# Patient Record
Sex: FEMALE | Race: ASIAN | NOT HISPANIC OR LATINO | URBAN - METROPOLITAN AREA
[De-identification: names, ages, dates, MRNs, and addresses within clinical notes are randomized per-mention and may not be internally consistent; named-entity substitution may affect disease eponyms.]

---

## 2017-01-26 ENCOUNTER — COMMUNICATION - HEALTHEAST (OUTPATIENT)
Dept: FAMILY MEDICINE | Facility: CLINIC | Age: 27
End: 2017-01-26

## 2017-01-26 ENCOUNTER — OFFICE VISIT - HEALTHEAST (OUTPATIENT)
Dept: FAMILY MEDICINE | Facility: CLINIC | Age: 27
End: 2017-01-26

## 2017-01-26 DIAGNOSIS — B37.31 YEAST VAGINITIS: ICD-10-CM

## 2017-01-26 DIAGNOSIS — N94.9 VAGINAL SYMPTOM: ICD-10-CM

## 2017-01-26 DIAGNOSIS — Z12.4 CERVICAL CANCER SCREENING: ICD-10-CM

## 2017-01-26 RX ORDER — POLYETHYLENE GLYCOL 3350 17 G/17G
POWDER, FOR SOLUTION ORAL
Refills: 4 | Status: SHIPPED | COMMUNITY
Start: 2016-10-19

## 2017-01-26 ASSESSMENT — MIFFLIN-ST. JEOR: SCORE: 1089.43

## 2017-01-27 ENCOUNTER — COMMUNICATION - HEALTHEAST (OUTPATIENT)
Dept: FAMILY MEDICINE | Facility: CLINIC | Age: 27
End: 2017-01-27

## 2017-02-01 LAB
HPV INTERPRETATION - HISTORICAL: NORMAL
HPV INTERPRETER - HISTORICAL: NORMAL

## 2017-02-02 LAB
BKR LAB AP ABNORMAL BLEEDING: NO
BKR LAB AP BIRTH CONTROL/HORMONES: NORMAL
BKR LAB AP CERVICAL APPEARANCE: NORMAL
BKR LAB AP GYN ADEQUACY: NORMAL
BKR LAB AP GYN INTERPRETATION: NORMAL
BKR LAB AP HPV REFLEX: NORMAL
BKR LAB AP LMP: 12
BKR LAB AP PATIENT STATUS: NORMAL
BKR LAB AP PREVIOUS ABNORMAL: NORMAL
BKR LAB AP PREVIOUS NORMAL: NORMAL
HIGH RISK?: NO
PATH REPORT.COMMENTS IMP SPEC: NORMAL
RESULT FLAG (HE HISTORICAL CONVERSION): NORMAL

## 2017-02-03 ENCOUNTER — COMMUNICATION - HEALTHEAST (OUTPATIENT)
Dept: FAMILY MEDICINE | Facility: CLINIC | Age: 27
End: 2017-02-03

## 2017-06-05 ENCOUNTER — PRENATAL OFFICE VISIT - HEALTHEAST (OUTPATIENT)
Dept: FAMILY MEDICINE | Facility: CLINIC | Age: 27
End: 2017-06-05

## 2017-06-05 DIAGNOSIS — Z32.01 POSITIVE PREGNANCY TEST: ICD-10-CM

## 2017-06-05 DIAGNOSIS — Z34.90 SUPERVISION OF NORMAL PREGNANCY: ICD-10-CM

## 2017-06-05 DIAGNOSIS — Z34.90 NORMAL PREGNANCY: ICD-10-CM

## 2017-06-05 LAB — HIV 1+2 AB+HIV1 P24 AG SERPL QL IA: NEGATIVE

## 2017-06-05 ASSESSMENT — MIFFLIN-ST. JEOR: SCORE: 1080.36

## 2017-06-06 LAB
HBV SURFACE AG SERPL QL IA: NEGATIVE
SYPHILIS RPR SCREEN - HISTORICAL: NORMAL

## 2017-06-12 ENCOUNTER — RECORDS - HEALTHEAST (OUTPATIENT)
Dept: ADMINISTRATIVE | Facility: OTHER | Age: 27
End: 2017-06-12

## 2017-08-30 ENCOUNTER — PRENATAL OFFICE VISIT - HEALTHEAST (OUTPATIENT)
Dept: FAMILY MEDICINE | Facility: CLINIC | Age: 27
End: 2017-08-30

## 2017-08-30 ENCOUNTER — RECORDS - HEALTHEAST (OUTPATIENT)
Dept: ADMINISTRATIVE | Facility: OTHER | Age: 27
End: 2017-08-30

## 2017-08-30 DIAGNOSIS — Z34.90 SUPERVISION OF NORMAL PREGNANCY: ICD-10-CM

## 2017-09-27 ENCOUNTER — PRENATAL OFFICE VISIT - HEALTHEAST (OUTPATIENT)
Dept: FAMILY MEDICINE | Facility: CLINIC | Age: 27
End: 2017-09-27

## 2017-09-27 DIAGNOSIS — Z34.90 SUPERVISION OF NORMAL PREGNANCY: ICD-10-CM

## 2017-09-27 DIAGNOSIS — Z34.93 SUPERVISION OF NORMAL PREGNANCY IN THIRD TRIMESTER: ICD-10-CM

## 2017-10-05 ENCOUNTER — AMBULATORY - HEALTHEAST (OUTPATIENT)
Dept: LAB | Facility: CLINIC | Age: 27
End: 2017-10-05

## 2017-10-05 ENCOUNTER — AMBULATORY - HEALTHEAST (OUTPATIENT)
Dept: FAMILY MEDICINE | Facility: CLINIC | Age: 27
End: 2017-10-05

## 2017-10-05 DIAGNOSIS — Z34.90 NORMAL PREGNANCY: ICD-10-CM

## 2017-10-06 LAB — SYPHILIS RPR SCREEN - HISTORICAL: NORMAL

## 2017-10-16 ENCOUNTER — AMBULATORY - HEALTHEAST (OUTPATIENT)
Dept: FAMILY MEDICINE | Facility: CLINIC | Age: 27
End: 2017-10-16

## 2017-10-16 DIAGNOSIS — D64.9 ANEMIA: ICD-10-CM

## 2017-10-17 ENCOUNTER — COMMUNICATION - HEALTHEAST (OUTPATIENT)
Dept: FAMILY MEDICINE | Facility: CLINIC | Age: 27
End: 2017-10-17

## 2017-10-26 ENCOUNTER — PRENATAL OFFICE VISIT - HEALTHEAST (OUTPATIENT)
Dept: FAMILY MEDICINE | Facility: CLINIC | Age: 27
End: 2017-10-26

## 2017-10-26 DIAGNOSIS — Z34.90 SUPERVISION OF NORMAL PREGNANCY: ICD-10-CM

## 2017-11-09 ENCOUNTER — PRENATAL OFFICE VISIT - HEALTHEAST (OUTPATIENT)
Dept: FAMILY MEDICINE | Facility: CLINIC | Age: 27
End: 2017-11-09

## 2017-11-09 DIAGNOSIS — Z34.90 SUPERVISION OF NORMAL PREGNANCY: ICD-10-CM

## 2017-11-17 ENCOUNTER — PRENATAL OFFICE VISIT - HEALTHEAST (OUTPATIENT)
Dept: FAMILY MEDICINE | Facility: CLINIC | Age: 27
End: 2017-11-17

## 2017-11-17 DIAGNOSIS — Z34.93 THIRD TRIMESTER PREGNANCY: ICD-10-CM

## 2017-11-17 DIAGNOSIS — Z34.90 SUPERVISION OF NORMAL PREGNANCY: ICD-10-CM

## 2017-11-24 ENCOUNTER — PRENATAL OFFICE VISIT - HEALTHEAST (OUTPATIENT)
Dept: FAMILY MEDICINE | Facility: CLINIC | Age: 27
End: 2017-11-24

## 2017-11-24 DIAGNOSIS — Z34.90 SUPERVISION OF NORMAL PREGNANCY: ICD-10-CM

## 2017-11-30 ENCOUNTER — PRENATAL OFFICE VISIT - HEALTHEAST (OUTPATIENT)
Dept: FAMILY MEDICINE | Facility: CLINIC | Age: 27
End: 2017-11-30

## 2017-11-30 DIAGNOSIS — Z34.90 SUPERVISION OF NORMAL PREGNANCY: ICD-10-CM

## 2017-12-07 ENCOUNTER — HOME CARE/HOSPICE - HEALTHEAST (OUTPATIENT)
Dept: HOME HEALTH SERVICES | Facility: HOME HEALTH | Age: 27
End: 2017-12-07

## 2017-12-08 ENCOUNTER — AMBULATORY - HEALTHEAST (OUTPATIENT)
Dept: FAMILY MEDICINE | Facility: CLINIC | Age: 27
End: 2017-12-08

## 2017-12-09 ENCOUNTER — HOME CARE/HOSPICE - HEALTHEAST (OUTPATIENT)
Dept: HOME HEALTH SERVICES | Facility: HOME HEALTH | Age: 27
End: 2017-12-09

## 2018-05-07 ENCOUNTER — COMMUNICATION - HEALTHEAST (OUTPATIENT)
Dept: FAMILY MEDICINE | Facility: CLINIC | Age: 28
End: 2018-05-07

## 2018-05-07 DIAGNOSIS — J30.9 ALLERGIC RHINITIS: ICD-10-CM

## 2020-01-13 ENCOUNTER — OFFICE VISIT - HEALTHEAST (OUTPATIENT)
Dept: FAMILY MEDICINE | Facility: CLINIC | Age: 30
End: 2020-01-13

## 2020-01-13 DIAGNOSIS — N94.6 MENSTRUAL CRAMPS: ICD-10-CM

## 2020-01-13 DIAGNOSIS — Z30.9 ENCOUNTER FOR CONTRACEPTIVE MANAGEMENT, UNSPECIFIED TYPE: ICD-10-CM

## 2020-01-13 LAB — HCG UR QL: NEGATIVE

## 2020-01-13 RX ORDER — NAPROXEN 500 MG/1
500 TABLET ORAL 2 TIMES DAILY WITH MEALS
Qty: 60 TABLET | Refills: 0 | Status: SHIPPED | OUTPATIENT
Start: 2020-01-13

## 2020-01-13 ASSESSMENT — MIFFLIN-ST. JEOR: SCORE: 1102.48

## 2020-04-09 ENCOUNTER — AMBULATORY - HEALTHEAST (OUTPATIENT)
Dept: FAMILY MEDICINE | Facility: CLINIC | Age: 30
End: 2020-04-09

## 2020-04-09 DIAGNOSIS — Z30.9 ENCOUNTER FOR CONTRACEPTIVE MANAGEMENT, UNSPECIFIED TYPE: ICD-10-CM

## 2020-04-13 ENCOUNTER — AMBULATORY - HEALTHEAST (OUTPATIENT)
Dept: NURSING | Facility: CLINIC | Age: 30
End: 2020-04-13

## 2021-05-30 VITALS — HEIGHT: 59 IN | WEIGHT: 102 LBS | BODY MASS INDEX: 20.56 KG/M2

## 2021-05-30 VITALS — BODY MASS INDEX: 20.96 KG/M2 | WEIGHT: 104 LBS | HEIGHT: 59 IN

## 2021-05-31 VITALS — BODY MASS INDEX: 27.53 KG/M2 | WEIGHT: 134 LBS

## 2021-05-31 VITALS — BODY MASS INDEX: 27.58 KG/M2 | WEIGHT: 134.25 LBS

## 2021-05-31 VITALS — WEIGHT: 134 LBS | BODY MASS INDEX: 27.53 KG/M2

## 2021-05-31 VITALS — WEIGHT: 129.01 LBS | BODY MASS INDEX: 26.5 KG/M2

## 2021-05-31 VITALS — BODY MASS INDEX: 27.94 KG/M2 | WEIGHT: 136 LBS

## 2021-05-31 VITALS — WEIGHT: 126 LBS | BODY MASS INDEX: 25.89 KG/M2

## 2021-05-31 VITALS — BODY MASS INDEX: 24.24 KG/M2 | WEIGHT: 118 LBS

## 2021-06-04 VITALS
BODY MASS INDEX: 22.46 KG/M2 | SYSTOLIC BLOOD PRESSURE: 112 MMHG | WEIGHT: 107 LBS | HEART RATE: 72 BPM | RESPIRATION RATE: 18 BRPM | DIASTOLIC BLOOD PRESSURE: 70 MMHG | HEIGHT: 58 IN

## 2021-06-05 ENCOUNTER — RECORDS - HEALTHEAST (OUTPATIENT)
Dept: ULTRASOUND IMAGING | Facility: CLINIC | Age: 31
End: 2021-06-05

## 2021-06-05 DIAGNOSIS — Z34.80 ENCOUNTER FOR SUPERVISION OF NORMAL PREGNANCY IN MULTIGRAVIDA: ICD-10-CM

## 2021-06-05 NOTE — PROGRESS NOTES
"  Subjective:    Riki Sprague is a 29 y.o. female who presents for evaluation of contraception management.  She is interested in using Depo-Provera.  She is used Depo-Provera before.  She notes that she only used it for 3 months.  During that time she did have pretty frequent bleeding.  However, she still prefers this to other forms of birth control.  She is used Nexplanon in the past and has also had continuous bleeding.  Patient's last menstrual period was 2020.      Patient Active Problem List   Diagnosis     Nonspecific reaction to tuberculin skin test without active tuberculosis     Abnormal Pap Smear Of Cervix     Constipation      (normal spontaneous vaginal delivery)     Postpartum hemorrhage     Acute blood loss anemia       Current Outpatient Medications:      fluticasone (FLONASE) 50 mcg/actuation nasal spray, 2 sprays into each nostril daily., Disp: 10 g, Rfl: 2     ibuprofen (ADVIL,MOTRIN) 800 MG tablet, Take 1 tablet (800 mg total) by mouth every 8 (eight) hours., Disp: 30 tablet, Rfl: 0     naproxen (NAPROSYN) 500 MG tablet, Take 1 tablet (500 mg total) by mouth 2 (two) times a day with meals., Disp: 60 tablet, Rfl: 0     polyethylene glycol (GLYCOLAX) 17 gram/dose powder, , Disp: , Rfl: 4  No current facility-administered medications for this visit.      Objective:   Allergies:  Patient has no known allergies.    Vitals:  Vitals:    20 1629   BP: 112/70   Patient Site: Right Arm   Patient Position: Sitting   Cuff Size: Adult Regular   Pulse: 72   Resp: 18   Weight: 107 lb (48.5 kg)   Height: 4' 10.47\" (1.485 m)     Body mass index is 22.01 kg/m .    Vital signs reviewed.  General: Patient is alert and oriented x 3, in no apparent distress  Cardiac: regular rate and rhythm, no murmurs  Pulmonary: lungs clear to auscultation bilaterally, no crackles, rales, rhonchi, or wheezing noted    Results for orders placed or performed in visit on 20   Pregnancy (Beta-hCG, Qual), Urine   Result " Value Ref Range    Pregnancy Test, Urine Negative Negative       Assessment and Plan:   1.  Contraception management, Depo-Provera.  Depo-Provera shot was given to patient today.  She will use backup birth control for the next 1 week.  Follow-up in 3 months for Depo-Provera renewal.  No other concerns.    2.  Menstrual cramps.  She reports that right before and during her period she does have bothersome abdominal cramps.  No history of stomach ulcers.  Prescription sent for naproxen and I reviewed appropriate use with her.  She will take with food.  Follow-up as needed.    This dictation uses voice recognition software, which may contain typographical errors.

## 2021-06-07 ENCOUNTER — RECORDS - HEALTHEAST (OUTPATIENT)
Dept: LAB | Facility: CLINIC | Age: 31
End: 2021-06-07

## 2021-06-07 DIAGNOSIS — Z34.80 ENCOUNTER FOR SUPERVISION OF NORMAL PREGNANCY IN MULTIGRAVIDA: ICD-10-CM

## 2021-06-08 NOTE — PROGRESS NOTES
abnl discharge since last delivery 2 years ago.    Pre was 3 days per month.  Now is one day and light flow.  Still monthly.  If pad is used, no period.  If no pad, is period.  Pre no cramps.  Now is cramp.  Aching.  Menstrual flow is orange.  And mucus.  Uses condom.  Nothing else.        Was rx patch in march of 2016.  But never used it as friend had abnl menses.  Pt used for one year intradermal progestation agent but had daily flow and had it removed after a year.    White discharge.  A little itch.  Sometimes.  Three months.      Has three children.    No unusual wt change.  Feels more cold than others.      Fmh: Family history not pertinent to presenting problem.     No thyroid issues    Six months short of 3 years for pap smear    ROS: as noted above    OBJECTIVE:   Vitals:    01/26/17 1048   BP: 94/72   Pulse: 80   Resp: 20      Eyes: non icteric, noninflamed  Lungs: no resp distress  Heart: regular  Ankles: no edema  Muscles: nontender  Mental status: euthymic  Neuro: nonfocal  Nl ext gen cervix   Wet prep yeast    ASSESSMENT/PLAN:    Additional diagnoses and related orders:  1. Vaginal symptom  Wet Prep, Vaginal    Gynecologic Cytology (PAP Smear)    Chlamydia trachomatis & Neisseria gonorrhoeae, Amplified Detection    fluconazole (DIFLUCAN) 150 MG tablet   2. Yeast vaginitis  fluconazole (DIFLUCAN) 150 MG tablet   3. Cervical cancer screening     4. Contraception       Discussed suboptimal contraception with condoms alone    Pap and chlamydia screen

## 2021-06-11 NOTE — PROGRESS NOTES
Chief Complaint:  Chief Complaint   Patient presents with     pregnancy confirmation     HPI:   Riki Sprague is a 26 y.o. female is here for pregnancy intake.  She is .  Patient's last menstrual period was 2017.  Positive home pregnancy test in February.  Having a little dizziness.  Small amount of white vaginal discharge daily for the past several days.  No itching.  Declines pelvic exam today.    Past medical history: reviewed and updated.  Past Medical History:   Diagnosis Date     Latent tuberculosis     treated with INH for 5 months     No past surgical history on file.    Current Outpatient Prescriptions:      polyethylene glycol (GLYCOLAX) 17 gram/dose powder, , Disp: , Rfl: 4     prenatal vit-iron fum-folic ac (PRENATAL VITAMIN) 27 mg iron- 0.8 mg Tab, Take 1 tablet by mouth once daily., Disp: 100 tablet, Rfl: 3    Social:  Social History   Substance Use Topics     Smoking status: Never Smoker     Smokeless tobacco: None     Alcohol use No       OBJECTIVE:  No Known Allergies  Vitals:    17 0905   BP: (!) 88/60   Pulse: 100   Resp: 18   Temp: 97.7  F (36.5  C)     Body mass index is 20.96 kg/(m^2).    Vital signs stable as recorded above  General: Patient is alert and oriented x 3, in no apparent distress  Fetal Exam: Fundal height was not palpable, fetal heart tones are heard at 145 bpm.  Patient reports no fetal movement.  Patient declined pelvic exam.    Results:  Results for orders placed or performed in visit on 17   Pregnancy, Urine   Result Value Ref Range    Pregnancy Test, Urine Positive (!) Negative     Other screening pregnancy lab work is ordered and pending.    Patient scored a 4/30 on Coy  Depression Screen.    Assessment and Plan:  1. Pregnancy Intake Appointment.  Riki Sprague is 26 y.o. and .  Patient's last menstrual period was 2017.  Estimated Date of Delivery: 17  She will be seeing Dr. Ross for OB care.  Screening pregnancy lab work  was drawn.  Prenatal vitamins prescribed.  Problem list and current medications reviewed and updated.  Dating ultrasound ordered.  Prenatal info packet given.    Follow up in 3 weeks.  Please see OB Episode for complete details.  Visit was approximately 35 minutes, greater than 50% of time spent in face-to-face counseling and coordination of care.    This dictation uses voice recognition software, which may contain typographical errors.

## 2021-06-12 NOTE — PROGRESS NOTES
Discussed screening for aneuploidy and neural tube defects. Will screen with repeat fetal survey.  First one done at 15 weeks so I do not trust it.    Reviewed intake exam, labs, JEFF, past medical history, past surgical history, family history, genetic history, and previous obstetrical history.    Having some pelvic pain.  Present since June.  Worse with activity.  Screen for cervical shortening with ultrasound.  She declined pelvic exam/cervical check today.    No ctx, lof, bleeding, or dc.  No HA or vision changes.     Return 1-2 weeks for GCT and 4 weeks for visit.

## 2021-06-13 NOTE — PROGRESS NOTES
No ctx, lof, bleeding, or dc.  No HA or vision changes.   Cannot stay for GCT.  Will RTC next week.  Flu, Tdap, today.

## 2021-06-14 NOTE — PROGRESS NOTES
No ctx, lof, bleeding, or dc.  No HA or vision changes.  Post dates management discussed.  Patient would like to deliver when possible.  Discussed induction, risks, benefits, alternatives.  Discussed recommendation to use cervical ripening at 40 weeks 6 days and IOL at 41 weeks.  That is arranged at [facility].

## 2021-06-14 NOTE — PROGRESS NOTES
No ctx, lof, bleeding, or dc.  No HA or vision changes.  Feels ok.  Motivated to be done with this pregnancy.

## 2021-06-14 NOTE — PROGRESS NOTES
[facility] does not deliver Breast pump to pt. They only delivery some medical equipments. Breast pump prescription faxed to [facility] at  # [phone] for home delivery.

## 2021-06-16 PROBLEM — Z30.9 ENCOUNTER FOR CONTRACEPTIVE MANAGEMENT, UNSPECIFIED TYPE: Status: ACTIVE | Noted: 2020-01-14

## 2021-07-14 PROBLEM — Z34.90 PREGNANT: Status: RESOLVED | Noted: 2017-12-06 | Resolved: 2017-12-06

## 2021-07-14 PROBLEM — Z34.90 NORMAL PREGNANCY: Status: RESOLVED | Noted: 2017-06-05 | Resolved: 2017-12-06
